# Patient Record
Sex: FEMALE | Race: OTHER | HISPANIC OR LATINO | URBAN - NONMETROPOLITAN AREA
[De-identification: names, ages, dates, MRNs, and addresses within clinical notes are randomized per-mention and may not be internally consistent; named-entity substitution may affect disease eponyms.]

---

## 2023-01-12 ENCOUNTER — APPOINTMENT (RX ONLY)
Dept: URBAN - NONMETROPOLITAN AREA CLINIC 19 | Facility: CLINIC | Age: 28
Setting detail: DERMATOLOGY
End: 2023-01-12

## 2023-01-12 VITALS — HEIGHT: 63 IN | WEIGHT: 130 LBS

## 2023-01-12 DIAGNOSIS — L70.0 ACNE VULGARIS: ICD-10-CM | Status: INADEQUATELY CONTROLLED

## 2023-01-12 PROCEDURE — ? COUNSELING

## 2023-01-12 PROCEDURE — ? URINE PREGNANCY TEST

## 2023-01-12 PROCEDURE — ? ISOTRETINOIN MONITORING

## 2023-01-12 PROCEDURE — ? PRESCRIPTION

## 2023-01-12 PROCEDURE — 81025 URINE PREGNANCY TEST: CPT

## 2023-01-12 PROCEDURE — 99204 OFFICE O/P NEW MOD 45 MIN: CPT

## 2023-01-12 RX ORDER — MINOCYCLINE HYDROCHLORIDE 100 MG/1
CAPSULE ORAL ONCE DAILY
Qty: 30 | Refills: 0 | Status: ERX | COMMUNITY
Start: 2023-01-12

## 2023-01-12 RX ADMIN — MINOCYCLINE HYDROCHLORIDE: 100 CAPSULE ORAL at 00:00

## 2023-01-12 ASSESSMENT — LOCATION SIMPLE DESCRIPTION DERM
LOCATION SIMPLE: LEFT CHEEK
LOCATION SIMPLE: RIGHT CHEEK
LOCATION SIMPLE: UPPER BACK

## 2023-01-12 ASSESSMENT — LOCATION DETAILED DESCRIPTION DERM
LOCATION DETAILED: LEFT CENTRAL MALAR CHEEK
LOCATION DETAILED: SUPERIOR THORACIC SPINE
LOCATION DETAILED: RIGHT CENTRAL MALAR CHEEK

## 2023-01-12 ASSESSMENT — LOCATION ZONE DERM
LOCATION ZONE: TRUNK
LOCATION ZONE: FACE

## 2023-01-12 NOTE — PROCEDURE: URINE PREGNANCY TEST
Performed By: Cristóbal Soni
Detail Level: None
Expiration Date (Optional): 04/2023
Urine Pregnancy Test Result: negative
Lot # (Optional): [id number]

## 2023-01-12 NOTE — PROCEDURE: COUNSELING
Birth Control Pills Counseling: Birth Control Pill Counseling: I discussed with the patient the potential side effects of OCPs including but not limited to increased risk of stroke, heart attack, thrombophlebitis, deep venous thrombosis, hepatic adenomas, breast changes, GI upset, headaches, and depression.  The patient verbalized understanding of the proper use and possible adverse effects of OCPs. All of the patient's questions and concerns were addressed.
Azelaic Acid Pregnancy And Lactation Text: This medication is considered safe during pregnancy and breast feeding.
Topical Sulfur Applications Counseling: Topical Sulfur Counseling: Patient counseled that this medication may cause skin irritation or allergic reactions.  In the event of skin irritation, the patient was advised to reduce the amount of the drug applied or use it less frequently.   The patient verbalized understanding of the proper use and possible adverse effects of topical sulfur application.  All of the patient's questions and concerns were addressed.
Benzoyl Peroxide Pregnancy And Lactation Text: This medication is Pregnancy Category C. It is unknown if benzoyl peroxide is excreted in breast milk.
Aklief Pregnancy And Lactation Text: It is unknown if this medication is safe to use during pregnancy.  It is unknown if this medication is excreted in breast milk.  Breastfeeding women should use the topical cream on the smallest area of the skin for the shortest time needed while breastfeeding.  Do not apply to nipple and areola.
Sarecycline Counseling: Patient advised regarding possible photosensitivity and discoloration of the teeth, skin, lips, tongue and gums.  Patient instructed to avoid sunlight, if possible.  When exposed to sunlight, patients should wear protective clothing, sunglasses, and sunscreen.  The patient was instructed to call the office immediately if the following severe adverse effects occur:  hearing changes, easy bruising/bleeding, severe headache, or vision changes.  The patient verbalized understanding of the proper use and possible adverse effects of sarecycline.  All of the patient's questions and concerns were addressed.
Erythromycin Pregnancy And Lactation Text: This medication is Pregnancy Category B and is considered safe during pregnancy. It is also excreted in breast milk.
Use Enhanced Medication Counseling?: No
Doxycycline Pregnancy And Lactation Text: This medication is Pregnancy Category D and not consider safe during pregnancy. It is also excreted in breast milk but is considered safe for shorter treatment courses.
Minocycline Counseling: Patient advised regarding possible photosensitivity and discoloration of the teeth, skin, lips, tongue and gums.  Patient instructed to avoid sunlight, if possible.  When exposed to sunlight, patients should wear protective clothing, sunglasses, and sunscreen.  The patient was instructed to call the office immediately if the following severe adverse effects occur:  hearing changes, easy bruising/bleeding, severe headache, or vision changes.  The patient verbalized understanding of the proper use and possible adverse effects of minocycline.  All of the patient's questions and concerns were addressed.
Bactrim Counseling:  I discussed with the patient the risks of sulfa antibiotics including but not limited to GI upset, allergic reaction, drug rash, diarrhea, dizziness, photosensitivity, and yeast infections.  Rarely, more serious reactions can occur including but not limited to aplastic anemia, agranulocytosis, methemoglobinemia, blood dyscrasias, liver or kidney failure, lung infiltrates or desquamative/blistering drug rashes.
Topical Clindamycin Counseling: Patient counseled that this medication may cause skin irritation or allergic reactions.  In the event of skin irritation, the patient was advised to reduce the amount of the drug applied or use it less frequently.   The patient verbalized understanding of the proper use and possible adverse effects of clindamycin.  All of the patient's questions and concerns were addressed.
Tetracycline Pregnancy And Lactation Text: This medication is Pregnancy Category D and not consider safe during pregnancy. It is also excreted in breast milk.
Tazorac Counseling:  Patient advised that medication is irritating and drying.  Patient may need to apply sparingly and wash off after an hour before eventually leaving it on overnight.  The patient verbalized understanding of the proper use and possible adverse effects of tazorac.  All of the patient's questions and concerns were addressed.
Dapsone Pregnancy And Lactation Text: This medication is Pregnancy Category C and is not considered safe during pregnancy or breast feeding.
High Dose Vitamin A Counseling: Side effects reviewed, pt to contact office should one occur.
Spironolactone Pregnancy And Lactation Text: This medication can cause feminization of the male fetus and should be avoided during pregnancy. The active metabolite is also found in breast milk.
Azithromycin Counseling:  I discussed with the patient the risks of azithromycin including but not limited to GI upset, allergic reaction, drug rash, diarrhea, and yeast infections.
Winlevi Pregnancy And Lactation Text: This medication is considered safe during pregnancy and breastfeeding.
Topical Sulfur Applications Pregnancy And Lactation Text: This medication is Pregnancy Category C and has an unknown safety profile during pregnancy. It is unknown if this topical medication is excreted in breast milk.
Topical Retinoid counseling:  Patient advised to apply a pea-sized amount only at bedtime and wait 30 minutes after washing their face before applying.  If too drying, patient may add a non-comedogenic moisturizer. The patient verbalized understanding of the proper use and possible adverse effects of retinoids.  All of the patient's questions and concerns were addressed.
Azelaic Acid Counseling: Patient counseled that medicine may cause skin irritation and to avoid applying near the eyes.  In the event of skin irritation, the patient was advised to reduce the amount of the drug applied or use it less frequently.   The patient verbalized understanding of the proper use and possible adverse effects of azelaic acid.  All of the patient's questions and concerns were addressed.
Birth Control Pills Pregnancy And Lactation Text: This medication should be avoided if pregnant and for the first 30 days post-partum.
Isotretinoin Counseling: Patient should get monthly blood tests, not donate blood, not drive at night if vision affected, not share medication, and not undergo elective surgery for 6 months after tx completed. Side effects reviewed, pt to contact office should one occur.
Erythromycin Counseling:  I discussed with the patient the risks of erythromycin including but not limited to GI upset, allergic reaction, drug rash, diarrhea, increase in liver enzymes, and yeast infections.
Bactrim Pregnancy And Lactation Text: This medication is Pregnancy Category D and is known to cause fetal risk.  It is also excreted in breast milk.
Benzoyl Peroxide Counseling: Patient counseled that medicine may cause skin irritation and bleach clothing.  In the event of skin irritation, the patient was advised to reduce the amount of the drug applied or use it less frequently.   The patient verbalized understanding of the proper use and possible adverse effects of benzoyl peroxide.  All of the patient's questions and concerns were addressed.
Topical Clindamycin Pregnancy And Lactation Text: This medication is Pregnancy Category B and is considered safe during pregnancy. It is unknown if it is excreted in breast milk.
Detail Level: Detailed
Aklief counseling:  Patient advised to apply a pea-sized amount only at bedtime and wait 30 minutes after washing their face before applying.  If too drying, patient may add a non-comedogenic moisturizer.  The most commonly reported side effects including irritation, redness, scaling, dryness, stinging, burning, itching, and increased risk of sunburn.  The patient verbalized understanding of the proper use and possible adverse effects of retinoids.  All of the patient's questions and concerns were addressed.
Doxycycline Counseling:  Patient counseled regarding possible photosensitivity and increased risk for sunburn.  Patient instructed to avoid sunlight, if possible.  When exposed to sunlight, patients should wear protective clothing, sunglasses, and sunscreen.  The patient was instructed to call the office immediately if the following severe adverse effects occur:  hearing changes, easy bruising/bleeding, severe headache, or vision changes.  The patient verbalized understanding of the proper use and possible adverse effects of doxycycline.  All of the patient's questions and concerns were addressed.
High Dose Vitamin A Pregnancy And Lactation Text: High dose vitamin A therapy is contraindicated during pregnancy and breast feeding.
Azithromycin Pregnancy And Lactation Text: This medication is considered safe during pregnancy and is also secreted in breast milk.
Tazorac Pregnancy And Lactation Text: This medication is not safe during pregnancy. It is unknown if this medication is excreted in breast milk.
Tetracycline Counseling: Patient counseled regarding possible photosensitivity and increased risk for sunburn.  Patient instructed to avoid sunlight, if possible.  When exposed to sunlight, patients should wear protective clothing, sunglasses, and sunscreen.  The patient was instructed to call the office immediately if the following severe adverse effects occur:  hearing changes, easy bruising/bleeding, severe headache, or vision changes.  The patient verbalized understanding of the proper use and possible adverse effects of tetracycline.  All of the patient's questions and concerns were addressed. Patient understands to avoid pregnancy while on therapy due to potential birth defects.
Winlevi Counseling:  I discussed with the patient the risks of topical clascoterone including but not limited to erythema, scaling, itching, and stinging. Patient voiced their understanding.
Topical Retinoid Pregnancy And Lactation Text: This medication is Pregnancy Category C. It is unknown if this medication is excreted in breast milk.
Spironolactone Counseling: Patient advised regarding risks of diarrhea, abdominal pain, hyperkalemia, birth defects (for female patients), liver toxicity and renal toxicity. The patient may need blood work to monitor liver and kidney function and potassium levels while on therapy. The patient verbalized understanding of the proper use and possible adverse effects of spironolactone.  All of the patient's questions and concerns were addressed.
Dapsone Counseling: I discussed with the patient the risks of dapsone including but not limited to hemolytic anemia, agranulocytosis, rashes, methemoglobinemia, kidney failure, peripheral neuropathy, headaches, GI upset, and liver toxicity.  Patients who start dapsone require monitoring including baseline LFTs and weekly CBCs for the first month, then every month thereafter.  The patient verbalized understanding of the proper use and possible adverse effects of dapsone.  All of the patient's questions and concerns were addressed.
Isotretinoin Pregnancy And Lactation Text: This medication is Pregnancy Category X and is considered extremely dangerous during pregnancy. It is unknown if it is excreted in breast milk.

## 2023-01-12 NOTE — PROCEDURE: ISOTRETINOIN MONITORING
Ipledge Number (Optional): [id number]
Pounds Preamble Statement (Weight Entered In Details Tab): Reported Weight in pounds:
Use Therapeutic Ranged Or Therapeutic Target: please select Range or Target
Retinoid Dermatitis Normal Treatment: I recommended more frequent application of Cetaphil or CeraVe to the areas of dermatitis.
Hypercholesterolemia Monitoring: I explained this is common when taking isotretinoin. We will monitor closely.
Patient Weight (Optional But Required For Cumulative Dose-Numbers And Decimals Only): 130
Xerosis Aggressive Treatment: I recommended application of Cetaphil or CeraVe numerous times a day and before going to bed to all dry areas. I also prescribed a topical steroid for twice daily use.
Nosebleeds Normal Treatment: I explained this is common when taking isotretinoin. I recommended saline mist in each nostril multiple times a day. If this worsens they will contact us.
Kilograms Preamble Statement (Weight Entered In Details Tab): Reported Weight in kilograms:
Cheilitis Normal Treatment: I recommended application of Vaseline or Aquaphor numerous times a day (as often as every hour) and before going to bed.
Weight Units: pounds
Female Completion Statement: After discussing her treatment course we decided to discontinue isotretinoin therapy at this time. I explained that she would need to continue her birth control methods for at least one month after the last dosage. She should also get a pregnancy test one month after the last dose. She shouldn't donate blood for one month after the last dose. She should call with any new symptoms of depression.
Retinoid Dermatitis Aggressive Treatment: I recommended more frequent application of Cetaphil or CeraVe to the areas of dermatitis. I also prescribed a topical steroid for twice daily use until the dermatitis resolves.
Lower Range (In Mg/Kg): 120
Male Completion Statement: After discussing his treatment course we decided to discontinue isotretinoin therapy at this time. He shouldn't donate blood for one month after the last dose. He should call with any new symptoms of depression.
Detail Level: Zone
Xerosis Normal Treatment: I recommended application of Cetaphil or CeraVe numerous times a day going to bed to all dry areas.
Headache Monitoring: I recommended monitoring the headaches for now. There is no evidence of increased intracranial pressure. They were instructed to call if the headaches are worsening.
Cheilitis Aggressive Treatment: I recommended application of Vaseline or Aquaphor numerous times a day (as often as every hour) and before going to bed. I also prescribed a topical steroid for twice daily use.
Use Enhanced Counseling Feature (Automatic): No
Upper Range (In Mg/Kg): 150
Months Of Therapy Completed: 1
Next Month's Dosage: Continue Current Dosage
Myalgia Monitoring: I explained this is common when taking isotretinoin. If this worsens they will contact us.
What Is The Patient's Gender: Female
Xerosis Aggressive Treatment: I recommended application of Cetaphil or CeraVe numerous times a day going to bed to all dry areas. I also prescribed a topical steroid for twice daily use.
Add Associated Diagnosis When Managing Medication Side Effects: Yes
Target Cumulative Dosage (In Mg/Kg): 135
Dosing Month 1 (Required For Cumulative Dosing): 40mg Daily
Myalgia Treatment: I explained this is common when taking isotretinoin. If this worsens they will contact us. They may try OTC ibuprofen.
Counseling Text: I reviewed the side effect in detail. Patient should get monthly blood tests, not donate blood, not drive at night if vision affected, and not share medication.
Are Labs Available For Review?: No- Not Drawn Yet
Female Pregnancy Counseling Text: Female patients should also be on two forms of birth control while taking this medication and for one month after their last dose.
Xerosis Normal Treatment: I recommended application of Cetaphil or CeraVe numerous times a day and before going to bed to all dry areas.

## 2023-02-14 ENCOUNTER — RX ONLY (OUTPATIENT)
Age: 28
Setting detail: RX ONLY
End: 2023-02-14

## 2023-02-14 RX ORDER — MINOCYCLINE HYDROCHLORIDE 100 MG/1
CAPSULE ORAL ONCE DAILY
Qty: 30 | Refills: 3 | Status: ERX

## 2023-04-14 ENCOUNTER — RX ONLY (OUTPATIENT)
Age: 28
Setting detail: RX ONLY
End: 2023-04-14

## 2023-04-14 RX ORDER — MINOCYCLINE HYDROCHLORIDE 100 MG/1
CAPSULE ORAL ONCE DAILY
Qty: 30 | Refills: 3 | Status: ERX